# Patient Record
Sex: FEMALE | Race: BLACK OR AFRICAN AMERICAN | NOT HISPANIC OR LATINO | URBAN - METROPOLITAN AREA
[De-identification: names, ages, dates, MRNs, and addresses within clinical notes are randomized per-mention and may not be internally consistent; named-entity substitution may affect disease eponyms.]

---

## 2022-04-19 ENCOUNTER — OUTPATIENT (OUTPATIENT)
Dept: OUTPATIENT SERVICES | Facility: HOSPITAL | Age: 52
LOS: 1 days | End: 2022-04-19
Payer: COMMERCIAL

## 2022-04-19 VITALS
HEART RATE: 86 BPM | TEMPERATURE: 98 F | WEIGHT: 160.06 LBS | DIASTOLIC BLOOD PRESSURE: 67 MMHG | OXYGEN SATURATION: 100 % | RESPIRATION RATE: 16 BRPM | SYSTOLIC BLOOD PRESSURE: 128 MMHG

## 2022-04-19 DIAGNOSIS — Z01.818 ENCOUNTER FOR OTHER PREPROCEDURAL EXAMINATION: ICD-10-CM

## 2022-04-19 DIAGNOSIS — D25.9 LEIOMYOMA OF UTERUS, UNSPECIFIED: ICD-10-CM

## 2022-04-19 DIAGNOSIS — Z98.890 OTHER SPECIFIED POSTPROCEDURAL STATES: Chronic | ICD-10-CM

## 2022-04-19 DIAGNOSIS — N92.0 EXCESSIVE AND FREQUENT MENSTRUATION WITH REGULAR CYCLE: ICD-10-CM

## 2022-04-19 LAB
BLD GP AB SCN SERPL QL: SIGNIFICANT CHANGE UP
HCG UR QL: NEGATIVE — SIGNIFICANT CHANGE UP
HCT VFR BLD CALC: 30.1 % — LOW (ref 34.5–45)
HGB BLD-MCNC: 9.3 G/DL — LOW (ref 11.5–15.5)
MCHC RBC-ENTMCNC: 27.2 PG — SIGNIFICANT CHANGE UP (ref 27–34)
MCHC RBC-ENTMCNC: 30.9 GM/DL — LOW (ref 32–36)
MCV RBC AUTO: 88 FL — SIGNIFICANT CHANGE UP (ref 80–100)
NRBC # BLD: 0 /100 WBCS — SIGNIFICANT CHANGE UP (ref 0–0)
PLATELET # BLD AUTO: 335 K/UL — SIGNIFICANT CHANGE UP (ref 150–400)
RBC # BLD: 3.42 M/UL — LOW (ref 3.8–5.2)
RBC # FLD: 17.1 % — HIGH (ref 10.3–14.5)
SARS-COV-2 RNA SPEC QL NAA+PROBE: SIGNIFICANT CHANGE UP
WBC # BLD: 13.37 K/UL — HIGH (ref 3.8–10.5)
WBC # FLD AUTO: 13.37 K/UL — HIGH (ref 3.8–10.5)

## 2022-04-19 PROCEDURE — 36415 COLL VENOUS BLD VENIPUNCTURE: CPT

## 2022-04-19 PROCEDURE — 86900 BLOOD TYPING SEROLOGIC ABO: CPT

## 2022-04-19 PROCEDURE — U0005: CPT

## 2022-04-19 PROCEDURE — 85027 COMPLETE CBC AUTOMATED: CPT

## 2022-04-19 PROCEDURE — 81025 URINE PREGNANCY TEST: CPT

## 2022-04-19 PROCEDURE — G0463: CPT

## 2022-04-19 PROCEDURE — 86850 RBC ANTIBODY SCREEN: CPT

## 2022-04-19 PROCEDURE — U0003: CPT

## 2022-04-19 PROCEDURE — 86901 BLOOD TYPING SEROLOGIC RH(D): CPT

## 2022-04-19 NOTE — H&P PST ADULT - PROBLEM SELECTOR PLAN 2
No medical clearance needed as per surgeon. CBC, T&S, HCG and COVID19 PCR ordered. Pre-op instructions and surgical scrubs given and pt verbalized understanding.

## 2022-04-19 NOTE — H&P PST ADULT - NSICDXPASTMEDICALHX_GEN_ALL_CORE_FT
PAST MEDICAL HISTORY:  Anemia      PAST MEDICAL HISTORY:  Anemia     Leiomyoma of uterus, unspecified

## 2022-04-19 NOTE — H&P PST ADULT - HISTORY OF PRESENT ILLNESS
53 y/o female with PMH of anemia here for PST. Pt states she has had a uterine fibroid for the last 20 years but getting larger in size since 12/2021. Pt reports to menorrhagia during her menses. Pt reports to last Hgb to be 8.0 about 1 month ago. Pt denies n/v/d and abdominal pain. Pt electing for total abdominal hysterectomy, bilateral salpingectomy, cysto on 4/21/22.

## 2022-04-21 ENCOUNTER — INPATIENT (INPATIENT)
Facility: HOSPITAL | Age: 52
LOS: 1 days | Discharge: HOME | DRG: 743 | End: 2022-04-23
Attending: SPECIALIST | Admitting: SPECIALIST
Payer: COMMERCIAL

## 2022-04-21 VITALS
HEART RATE: 93 BPM | OXYGEN SATURATION: 97 % | DIASTOLIC BLOOD PRESSURE: 71 MMHG | WEIGHT: 160.06 LBS | SYSTOLIC BLOOD PRESSURE: 126 MMHG | RESPIRATION RATE: 14 BRPM | TEMPERATURE: 98 F | HEIGHT: 63 IN

## 2022-04-21 DIAGNOSIS — Z98.890 OTHER SPECIFIED POSTPROCEDURAL STATES: Chronic | ICD-10-CM

## 2022-04-21 DIAGNOSIS — N92.0 EXCESSIVE AND FREQUENT MENSTRUATION WITH REGULAR CYCLE: ICD-10-CM

## 2022-04-21 LAB
ABO RH CONFIRMATION: SIGNIFICANT CHANGE UP
HCT VFR BLD CALC: 27.8 % — LOW (ref 34.5–45)
HGB BLD-MCNC: 8.7 G/DL — LOW (ref 11.5–15.5)
MCHC RBC-ENTMCNC: 27.2 PG — SIGNIFICANT CHANGE UP (ref 27–34)
MCHC RBC-ENTMCNC: 31.3 GM/DL — LOW (ref 32–36)
MCV RBC AUTO: 86.9 FL — SIGNIFICANT CHANGE UP (ref 80–100)
NRBC # BLD: 0 /100 WBCS — SIGNIFICANT CHANGE UP (ref 0–0)
PLATELET # BLD AUTO: 332 K/UL — SIGNIFICANT CHANGE UP (ref 150–400)
RBC # BLD: 3.2 M/UL — LOW (ref 3.8–5.2)
RBC # FLD: 16.5 % — HIGH (ref 10.3–14.5)
WBC # BLD: 17.75 K/UL — HIGH (ref 3.8–10.5)
WBC # FLD AUTO: 17.75 K/UL — HIGH (ref 3.8–10.5)

## 2022-04-21 PROCEDURE — 88305 TISSUE EXAM BY PATHOLOGIST: CPT | Mod: 26

## 2022-04-21 PROCEDURE — 88307 TISSUE EXAM BY PATHOLOGIST: CPT | Mod: 26

## 2022-04-21 DEVICE — ARISTA 3GR
Type: IMPLANTABLE DEVICE | Status: NON-FUNCTIONAL
Removed: 2022-04-21

## 2022-04-21 RX ORDER — ACETAMINOPHEN 500 MG
650 TABLET ORAL EVERY 4 HOURS
Refills: 0 | Status: DISCONTINUED | OUTPATIENT
Start: 2022-04-21 | End: 2022-04-23

## 2022-04-21 RX ORDER — SODIUM CHLORIDE 9 MG/ML
1000 INJECTION, SOLUTION INTRAVENOUS
Refills: 0 | Status: DISCONTINUED | OUTPATIENT
Start: 2022-04-21 | End: 2022-04-21

## 2022-04-21 RX ORDER — ONDANSETRON 8 MG/1
4 TABLET, FILM COATED ORAL ONCE
Refills: 0 | Status: DISCONTINUED | OUTPATIENT
Start: 2022-04-21 | End: 2022-04-21

## 2022-04-21 RX ORDER — SODIUM CHLORIDE 9 MG/ML
1000 INJECTION, SOLUTION INTRAVENOUS
Refills: 0 | Status: DISCONTINUED | OUTPATIENT
Start: 2022-04-21 | End: 2022-04-22

## 2022-04-21 RX ORDER — HYDROMORPHONE HYDROCHLORIDE 2 MG/ML
0.5 INJECTION INTRAMUSCULAR; INTRAVENOUS; SUBCUTANEOUS
Refills: 0 | Status: DISCONTINUED | OUTPATIENT
Start: 2022-04-21 | End: 2022-04-21

## 2022-04-21 RX ORDER — METOCLOPRAMIDE HCL 10 MG
10 TABLET ORAL EVERY 4 HOURS
Refills: 0 | Status: DISCONTINUED | OUTPATIENT
Start: 2022-04-21 | End: 2022-04-23

## 2022-04-21 RX ORDER — SIMETHICONE 80 MG/1
80 TABLET, CHEWABLE ORAL EVERY 4 HOURS
Refills: 0 | Status: DISCONTINUED | OUTPATIENT
Start: 2022-04-21 | End: 2022-04-23

## 2022-04-21 RX ORDER — IBUPROFEN 200 MG
600 TABLET ORAL EVERY 6 HOURS
Refills: 0 | Status: DISCONTINUED | OUTPATIENT
Start: 2022-04-21 | End: 2022-04-23

## 2022-04-21 RX ORDER — METRONIDAZOLE 500 MG
500 TABLET ORAL ONCE
Refills: 0 | Status: DISCONTINUED | OUTPATIENT
Start: 2022-04-21 | End: 2022-04-21

## 2022-04-21 RX ORDER — OXYCODONE HYDROCHLORIDE 5 MG/1
5 TABLET ORAL EVERY 4 HOURS
Refills: 0 | Status: DISCONTINUED | OUTPATIENT
Start: 2022-04-21 | End: 2022-04-23

## 2022-04-21 RX ORDER — ACETAMINOPHEN 500 MG
1000 TABLET ORAL ONCE
Refills: 0 | Status: COMPLETED | OUTPATIENT
Start: 2022-04-21 | End: 2022-04-21

## 2022-04-21 RX ORDER — CEFAZOLIN SODIUM 1 G
2000 VIAL (EA) INJECTION ONCE
Refills: 0 | Status: DISCONTINUED | OUTPATIENT
Start: 2022-04-21 | End: 2022-04-21

## 2022-04-21 RX ADMIN — Medication 650 MILLIGRAM(S): at 21:22

## 2022-04-21 RX ADMIN — HYDROMORPHONE HYDROCHLORIDE 0.5 MILLIGRAM(S): 2 INJECTION INTRAMUSCULAR; INTRAVENOUS; SUBCUTANEOUS at 14:40

## 2022-04-21 RX ADMIN — HYDROMORPHONE HYDROCHLORIDE 0.5 MILLIGRAM(S): 2 INJECTION INTRAMUSCULAR; INTRAVENOUS; SUBCUTANEOUS at 13:46

## 2022-04-21 RX ADMIN — HYDROMORPHONE HYDROCHLORIDE 0.5 MILLIGRAM(S): 2 INJECTION INTRAMUSCULAR; INTRAVENOUS; SUBCUTANEOUS at 14:06

## 2022-04-21 RX ADMIN — SODIUM CHLORIDE 125 MILLILITER(S): 9 INJECTION, SOLUTION INTRAVENOUS at 14:44

## 2022-04-21 RX ADMIN — SODIUM CHLORIDE 75 MILLILITER(S): 9 INJECTION, SOLUTION INTRAVENOUS at 13:47

## 2022-04-21 RX ADMIN — HYDROMORPHONE HYDROCHLORIDE 0.5 MILLIGRAM(S): 2 INJECTION INTRAMUSCULAR; INTRAVENOUS; SUBCUTANEOUS at 14:58

## 2022-04-21 RX ADMIN — HYDROMORPHONE HYDROCHLORIDE 0.5 MILLIGRAM(S): 2 INJECTION INTRAMUSCULAR; INTRAVENOUS; SUBCUTANEOUS at 14:21

## 2022-04-21 RX ADMIN — SIMETHICONE 80 MILLIGRAM(S): 80 TABLET, CHEWABLE ORAL at 21:23

## 2022-04-21 RX ADMIN — HYDROMORPHONE HYDROCHLORIDE 0.5 MILLIGRAM(S): 2 INJECTION INTRAMUSCULAR; INTRAVENOUS; SUBCUTANEOUS at 14:25

## 2022-04-21 RX ADMIN — Medication 650 MILLIGRAM(S): at 17:13

## 2022-04-21 RX ADMIN — Medication 600 MILLIGRAM(S): at 18:20

## 2022-04-21 RX ADMIN — Medication 600 MILLIGRAM(S): at 23:33

## 2022-04-21 RX ADMIN — Medication 650 MILLIGRAM(S): at 18:20

## 2022-04-21 RX ADMIN — HYDROMORPHONE HYDROCHLORIDE 0.5 MILLIGRAM(S): 2 INJECTION INTRAMUSCULAR; INTRAVENOUS; SUBCUTANEOUS at 15:13

## 2022-04-21 RX ADMIN — SIMETHICONE 80 MILLIGRAM(S): 80 TABLET, CHEWABLE ORAL at 17:14

## 2022-04-21 RX ADMIN — Medication 600 MILLIGRAM(S): at 17:13

## 2022-04-21 RX ADMIN — Medication 400 MILLIGRAM(S): at 14:15

## 2022-04-21 RX ADMIN — HYDROMORPHONE HYDROCHLORIDE 0.5 MILLIGRAM(S): 2 INJECTION INTRAMUSCULAR; INTRAVENOUS; SUBCUTANEOUS at 14:01

## 2022-04-21 NOTE — BRIEF OPERATIVE NOTE - NSICDXBRIEFPREOP_GEN_ALL_CORE_FT
PRE-OP DIAGNOSIS:  Fibroid uterus 21-Apr-2022 13:49:56  Tomi Baeza  Heavy menses 21-Apr-2022 13:50:25  Tomi Baeza

## 2022-04-21 NOTE — BRIEF OPERATIVE NOTE - NSICDXBRIEFPOSTOP_GEN_ALL_CORE_FT
POST-OP DIAGNOSIS:  Fibroid, uterine 21-Apr-2022 13:52:03  Tomi Baeza  Heavy menses 21-Apr-2022 13:52:21  Tomi Baeza  Menorrhagia 21-Apr-2022 13:53:00  Tomi Baeza

## 2022-04-21 NOTE — PATIENT PROFILE ADULT - FALL HARM RISK - UNIVERSAL INTERVENTIONS
Bed in lowest position, wheels locked, appropriate side rails in place/Call bell, personal items and telephone in reach/Instruct patient to call for assistance before getting out of bed or chair/Non-slip footwear when patient is out of bed/[place] to call system/Physically safe environment - no spills, clutter or unnecessary equipment/Purposeful Proactive Rounding/Room/bathroom lighting operational, light cord in reach

## 2022-04-21 NOTE — BRIEF OPERATIVE NOTE - OPERATION/FINDINGS
22 weeks size myomas, scant superficial serosal endometriosis, polycystic ovaries, bilateral tubes normal

## 2022-04-22 PROCEDURE — 85027 COMPLETE CBC AUTOMATED: CPT

## 2022-04-22 PROCEDURE — 82962 GLUCOSE BLOOD TEST: CPT

## 2022-04-22 PROCEDURE — 36415 COLL VENOUS BLD VENIPUNCTURE: CPT

## 2022-04-22 PROCEDURE — 88305 TISSUE EXAM BY PATHOLOGIST: CPT

## 2022-04-22 PROCEDURE — C1889: CPT

## 2022-04-22 PROCEDURE — 88307 TISSUE EXAM BY PATHOLOGIST: CPT

## 2022-04-22 RX ORDER — IRON SUCROSE 20 MG/ML
200 INJECTION, SOLUTION INTRAVENOUS ONCE
Refills: 0 | Status: COMPLETED | OUTPATIENT
Start: 2022-04-22 | End: 2022-04-22

## 2022-04-22 RX ADMIN — SIMETHICONE 80 MILLIGRAM(S): 80 TABLET, CHEWABLE ORAL at 21:31

## 2022-04-22 RX ADMIN — IRON SUCROSE 110 MILLIGRAM(S): 20 INJECTION, SOLUTION INTRAVENOUS at 11:52

## 2022-04-22 RX ADMIN — Medication 650 MILLIGRAM(S): at 02:02

## 2022-04-22 RX ADMIN — SIMETHICONE 80 MILLIGRAM(S): 80 TABLET, CHEWABLE ORAL at 13:18

## 2022-04-22 RX ADMIN — SIMETHICONE 80 MILLIGRAM(S): 80 TABLET, CHEWABLE ORAL at 11:01

## 2022-04-22 RX ADMIN — Medication 600 MILLIGRAM(S): at 12:49

## 2022-04-22 RX ADMIN — SIMETHICONE 80 MILLIGRAM(S): 80 TABLET, CHEWABLE ORAL at 02:04

## 2022-04-22 RX ADMIN — Medication 600 MILLIGRAM(S): at 23:55

## 2022-04-22 RX ADMIN — SIMETHICONE 80 MILLIGRAM(S): 80 TABLET, CHEWABLE ORAL at 05:19

## 2022-04-22 RX ADMIN — Medication 600 MILLIGRAM(S): at 11:52

## 2022-04-22 RX ADMIN — SIMETHICONE 80 MILLIGRAM(S): 80 TABLET, CHEWABLE ORAL at 18:00

## 2022-04-23 VITALS
SYSTOLIC BLOOD PRESSURE: 115 MMHG | HEART RATE: 72 BPM | OXYGEN SATURATION: 95 % | DIASTOLIC BLOOD PRESSURE: 73 MMHG | WEIGHT: 176.81 LBS | RESPIRATION RATE: 18 BRPM | TEMPERATURE: 98 F

## 2022-04-23 RX ORDER — FERROUS SULFATE 325(65) MG
1 TABLET ORAL
Qty: 0 | Refills: 0 | DISCHARGE

## 2022-04-23 NOTE — DISCHARGE NOTE PROVIDER - NSDCFUADDINST_GEN_ALL_CORE_FT
Nothing in vagina for 6 weeks--no sex/tampons/ tub baths/douching. Limit heavy lifting/driving/stair climbing for 2 weeks

## 2022-04-23 NOTE — DISCHARGE NOTE NURSING/CASE MANAGEMENT/SOCIAL WORK - NSDCPEFALRISK_GEN_ALL_CORE
For information on Fall & Injury Prevention, visit: [email or web address] OR  [email or web address] OR  https://www.cdc.gov/steadi/patient.html

## 2022-04-23 NOTE — DISCHARGE NOTE NURSING/CASE MANAGEMENT/SOCIAL WORK - PATIENT PORTAL LINK FT
You can access the FollowMyHealth Patient Portal offered by [facility] by registering at the following website: [email or web address]. By joining [email or web address]’s FollowMyHealth portal, you will also be able to view your health information using other applications (apps) compatible with our system.

## 2022-04-23 NOTE — DISCHARGE NOTE PROVIDER - NSDCCPCAREPLAN_GEN_ALL_CORE_FT
PRINCIPAL DISCHARGE DIAGNOSIS  Diagnosis: Uterine myoma  Assessment and Plan of Treatment:       SECONDARY DISCHARGE DIAGNOSES  Diagnosis: Menorrhagia  Assessment and Plan of Treatment:     Diagnosis: Chronic blood loss anemia  Assessment and Plan of Treatment:

## 2022-04-23 NOTE — DISCHARGE NOTE PROVIDER - HOSPITAL COURSE
Uncomplicated vasiliy/bso and post-op course. Given 1 dose of iv iron for chronic anemia present on admission

## 2022-04-23 NOTE — PROGRESS NOTE ADULT - SUBJECTIVE AND OBJECTIVE BOX
The patient was interviewed and evaluated.    52y Female    T(C): 36.8 (04-22-22 @ 05:24), Max: 37 (04-21-22 @ 13:29)  HR: 56 (04-22-22 @ 05:24) (54 - 87)  BP: 115/70 (04-22-22 @ 05:24) (108/66 - 129/73)  RR: 18 (04-22-22 @ 05:24) (14 - 19)  SpO2: 98% (04-22-22 @ 05:24) (93% - 100%)  Wt(kg): --    No Nausea/vomiting, recall, sore throat or headache.    No anesthesia related complaints or sequelae.        
No change to h and p since authored
POD 2  PT S COMPS, POS FLATUS, AMBULATING S COMPS  VSS, AFEB  ABD SOFT, NT, ND, POS BS  NO EDEMA  LUNGS CTA  INC WNL    D/C HOME  NO HOME CARE NEEDED  F/U OFFICE 2 WEEKS
PT S COMPS  VSS, AFEB  ABD SOFT, NT, ND, NO BS  INC WNL  LUNGS CTA    HGB 8+, PREOP 9+    PT STABLE  HAS BEEN AMBULATING  WHEN VOIDING AND PASSING FLATUS, CAN D/C HOME  FOR IV IRON
pt s comps  ambulating s issues  abd soft, t, nd scant bs    continue care  for discharge when passing flatus  no home care needed

## 2022-04-23 NOTE — DISCHARGE NOTE PROVIDER - CARE PROVIDER_API CALL
Tomi Baeza)  Obstetrics and Gynecology  [address] [place] [address]  Phone: [phone]  Fax: [phone]  Follow Up Time:

## 2022-05-04 LAB — SURGICAL PATHOLOGY STUDY: SIGNIFICANT CHANGE UP

## 2024-05-29 NOTE — ASU PATIENT PROFILE, ADULT - INTERNATIONAL TRAVEL
Surgery Scheduled for: 6/5/24 with Dr. Ontiveros.  Call placed to patient to go over expected start time, arrival time and NPO Status.    No answer, generic voicemail left for patient to return call to [phone] and ask for Stephenie [phone] in surgery scheduling.     Call Center: Please reach out to [facility] [phone] [place] [facility] via Epic Secure Chat to see if a Surgery Scheduler is available to take patient's call.    If a  is not available, please document in this encounter that patient called back along with verifying good times to call them back and ROUTE message to \"[facility] Schedul[facility] Msg Pool\".      
Surgery Scheduled for: 6/5/24 with Dr. Parks.  Call placed to patient to go over expected start time, arrival time and NPO Status.  Surgery start time is at: 1pm  Arrival of: 11am the morning of surgery.   No Food after: 1am the morning of surgery.  Drink the 10 ounces of water at 7:30am on 6/5/24.  No Fluids after: 8am the morning of surgery.    Patient confirms insurance coverage as: [employer].    Illness Screening was: negative.  Testing Needed? no.    Patient confirms transportation home from surgery as: Friend  Patient aware that she will need a  to take her home from surgery and that we do not accept public and/or private service transportation without a person they personally know on the vehicle with them.    Patient is aware of the current visitor policy per date of this documentation.     No questions or concerns at this time.       
No

## (undated) DEVICE — DRSG MASTISOL

## (undated) DEVICE — FOLEY TRAY 16FR LF URINE METER SURESTEP

## (undated) DEVICE — GOWN LG

## (undated) DEVICE — SUT PLAIN GUT 2-0 30" GS-21

## (undated) DEVICE — DRSG COMBINE 5X9"

## (undated) DEVICE — Device

## (undated) DEVICE — ELCTR BOVIE PENCIL SMOKE EVACUATION

## (undated) DEVICE — LAP PAD W RING 18 X 18"

## (undated) DEVICE — GLV 7 PROTEXIS (WHITE)

## (undated) DEVICE — PLV/PSP-ESU T7E14768DX: Type: DURABLE MEDICAL EQUIPMENT

## (undated) DEVICE — DRSG TELFA 3 X 8

## (undated) DEVICE — VENODYNE/SCD SLEEVE CALF LARGE

## (undated) DEVICE — DRAPE TOWEL BLUE 17" X 24"

## (undated) DEVICE — SUT CHROMIC 1 30" GS-21

## (undated) DEVICE — SOL IRR POUR NS 0.9% 1000ML

## (undated) DEVICE — SUT POLYSORB 1 27" GS-21 UNDYED

## (undated) DEVICE — SYR LUER LOK 10CC

## (undated) DEVICE — DRAPE 3/4 SHEET W REINFORCEMENT 56X77"

## (undated) DEVICE — NDL HYPO SAFE 22G X 1.5" (BLACK)

## (undated) DEVICE — WARMING BLANKET UPPER ADULT

## (undated) DEVICE — LIGASURE IMPACT

## (undated) DEVICE — PLV-SCD MACHINE: Type: DURABLE MEDICAL EQUIPMENT

## (undated) DEVICE — PREP KIT SKIN PREP DRY

## (undated) DEVICE — DRSG STERISTRIPS 0.5 X 4"

## (undated) DEVICE — PACK MAJOR ABDOMINAL WITH LAP

## (undated) DEVICE — VENODYNE/SCD SLEEVE CALF MEDIUM